# Patient Record
Sex: MALE | Race: WHITE
[De-identification: names, ages, dates, MRNs, and addresses within clinical notes are randomized per-mention and may not be internally consistent; named-entity substitution may affect disease eponyms.]

---

## 2018-01-04 ENCOUNTER — HOSPITAL ENCOUNTER (INPATIENT)
Dept: HOSPITAL 96 - M.ERS | Age: 73
LOS: 3 days | Discharge: HOME | DRG: 246 | End: 2018-01-07
Attending: INTERNAL MEDICINE | Admitting: INTERNAL MEDICINE
Payer: COMMERCIAL

## 2018-01-04 VITALS — SYSTOLIC BLOOD PRESSURE: 125 MMHG | DIASTOLIC BLOOD PRESSURE: 69 MMHG

## 2018-01-04 VITALS — WEIGHT: 189 LBS | BODY MASS INDEX: 27.99 KG/M2 | HEIGHT: 69.02 IN

## 2018-01-04 VITALS — SYSTOLIC BLOOD PRESSURE: 138 MMHG | DIASTOLIC BLOOD PRESSURE: 84 MMHG

## 2018-01-04 VITALS — SYSTOLIC BLOOD PRESSURE: 136 MMHG | DIASTOLIC BLOOD PRESSURE: 74 MMHG

## 2018-01-04 DIAGNOSIS — Z79.4: ICD-10-CM

## 2018-01-04 DIAGNOSIS — Z79.82: ICD-10-CM

## 2018-01-04 DIAGNOSIS — I50.31: ICD-10-CM

## 2018-01-04 DIAGNOSIS — G20: ICD-10-CM

## 2018-01-04 DIAGNOSIS — Z85.820: ICD-10-CM

## 2018-01-04 DIAGNOSIS — Z87.891: ICD-10-CM

## 2018-01-04 DIAGNOSIS — Z90.49: ICD-10-CM

## 2018-01-04 DIAGNOSIS — Z88.5: ICD-10-CM

## 2018-01-04 DIAGNOSIS — E11.65: ICD-10-CM
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
## 2018-01-04 DIAGNOSIS — Z79.51: ICD-10-CM

## 2018-01-04 DIAGNOSIS — I44.7: ICD-10-CM

## 2018-01-04 DIAGNOSIS — Z79.899: ICD-10-CM

## 2018-01-04 DIAGNOSIS — Z88.8: ICD-10-CM

## 2018-01-04 DIAGNOSIS — Z95.5: ICD-10-CM

## 2018-01-04 DIAGNOSIS — E78.5: ICD-10-CM

## 2018-01-04 DIAGNOSIS — I25.110: Primary | ICD-10-CM
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
## 2018-01-04 DIAGNOSIS — T82.855A: ICD-10-CM

## 2018-01-04 DIAGNOSIS — I25.2: ICD-10-CM

## 2018-01-04 LAB
ABSOLUTE BASOPHILS: 0.1 THOU/UL (ref 0–0.2)
ABSOLUTE EOSINOPHILS: 0.1 THOU/UL (ref 0–0.7)
ABSOLUTE MONOCYTES: 0.5 THOU/UL (ref 0–1.2)
ALBUMIN SERPL-MCNC: 3.4 G/DL (ref 3.4–5)
ALP SERPL-CCNC: 78 U/L (ref 46–116)
ALT SERPL-CCNC: 23 U/L (ref 30–65)
ANION GAP SERPL CALC-SCNC: 12 MMOL/L (ref 7–16)
APTT BLD: 24.9 SECONDS (ref 25–31.3)
AST SERPL-CCNC: 14 U/L (ref 15–37)
BASOPHILS NFR BLD AUTO: 0.8 %
BILIRUB SERPL-MCNC: 0.5 MG/DL
BUN SERPL-MCNC: 16 MG/DL (ref 7–18)
CALCIUM SERPL-MCNC: 8.6 MG/DL (ref 8.5–10.1)
CHLORIDE SERPL-SCNC: 99 MMOL/L (ref 98–107)
CO2 SERPL-SCNC: 25 MMOL/L (ref 21–32)
CREAT SERPL-MCNC: 0.9 MG/DL (ref 0.6–1.3)
EOSINOPHIL NFR BLD: 2 %
GLUCOSE SERPL-MCNC: 384 MG/DL (ref 70–99)
GRANULOCYTES NFR BLD MANUAL: 75.4 %
HCT VFR BLD CALC: 49 % (ref 42–52)
HGB BLD-MCNC: 16.2 GM/DL (ref 14–18)
INR PPP: 1.1
LIPASE: 54 U/L (ref 73–393)
LYMPHOCYTES # BLD: 1.1 THOU/UL (ref 0.8–5.3)
LYMPHOCYTES NFR BLD AUTO: 14.7 %
MCH RBC QN AUTO: 27 PG (ref 26–34)
MCHC RBC AUTO-ENTMCNC: 33 G/DL (ref 28–37)
MCV RBC: 81.9 FL (ref 80–100)
MONOCYTES NFR BLD: 7.1 %
MPV: 8.2 FL. (ref 7.2–11.1)
NEUTROPHILS # BLD: 5.5 THOU/UL (ref 1.6–8.1)
NT-PRO BRAIN NAT PEPTIDE: 2720 PG/ML (ref ?–300)
NUCLEATED RBCS: 0 /100WBC
PLATELET COUNT*: 265 THOU/UL (ref 150–400)
POTASSIUM SERPL-SCNC: 4.6 MMOL/L (ref 3.5–5.1)
PROT SERPL-MCNC: 6.8 G/DL (ref 6.4–8.2)
PROTHROMBIN TIME: 10.6 SECONDS (ref 9.2–11.5)
RBC # BLD AUTO: 5.99 MIL/UL (ref 4.5–6)
RDW-CV: 14.3 % (ref 10.5–14.5)
SODIUM SERPL-SCNC: 136 MMOL/L (ref 136–145)
TROPONIN-I LEVEL: <0.06 NG/ML (ref ?–0.06)
WBC # BLD AUTO: 7.3 THOU/UL (ref 4–11)

## 2018-01-05 VITALS — DIASTOLIC BLOOD PRESSURE: 66 MMHG | SYSTOLIC BLOOD PRESSURE: 123 MMHG

## 2018-01-05 VITALS — SYSTOLIC BLOOD PRESSURE: 114 MMHG | DIASTOLIC BLOOD PRESSURE: 56 MMHG

## 2018-01-05 VITALS — SYSTOLIC BLOOD PRESSURE: 111 MMHG | DIASTOLIC BLOOD PRESSURE: 67 MMHG

## 2018-01-05 VITALS — SYSTOLIC BLOOD PRESSURE: 119 MMHG | DIASTOLIC BLOOD PRESSURE: 75 MMHG

## 2018-01-05 VITALS — SYSTOLIC BLOOD PRESSURE: 119 MMHG | DIASTOLIC BLOOD PRESSURE: 66 MMHG

## 2018-01-05 VITALS — SYSTOLIC BLOOD PRESSURE: 132 MMHG | DIASTOLIC BLOOD PRESSURE: 72 MMHG

## 2018-01-05 VITALS — DIASTOLIC BLOOD PRESSURE: 68 MMHG | SYSTOLIC BLOOD PRESSURE: 132 MMHG

## 2018-01-05 VITALS — SYSTOLIC BLOOD PRESSURE: 132 MMHG | DIASTOLIC BLOOD PRESSURE: 66 MMHG

## 2018-01-05 VITALS — SYSTOLIC BLOOD PRESSURE: 126 MMHG | DIASTOLIC BLOOD PRESSURE: 59 MMHG

## 2018-01-05 VITALS — DIASTOLIC BLOOD PRESSURE: 66 MMHG | SYSTOLIC BLOOD PRESSURE: 118 MMHG

## 2018-01-05 VITALS — DIASTOLIC BLOOD PRESSURE: 58 MMHG | SYSTOLIC BLOOD PRESSURE: 112 MMHG

## 2018-01-05 VITALS — SYSTOLIC BLOOD PRESSURE: 107 MMHG | DIASTOLIC BLOOD PRESSURE: 60 MMHG

## 2018-01-05 VITALS — SYSTOLIC BLOOD PRESSURE: 130 MMHG | DIASTOLIC BLOOD PRESSURE: 72 MMHG

## 2018-01-05 VITALS — SYSTOLIC BLOOD PRESSURE: 118 MMHG | DIASTOLIC BLOOD PRESSURE: 66 MMHG

## 2018-01-05 LAB
ABSOLUTE MONOCYTES: 0.2 THOU/UL (ref 0–1.2)
ANION GAP SERPL CALC-SCNC: 11 MMOL/L (ref 7–16)
ANISOCYTOSIS BLD QL SMEAR: (no result)
BUN SERPL-MCNC: 25 MG/DL (ref 7–18)
CALCIUM SERPL-MCNC: 8 MG/DL (ref 8.5–10.1)
CHLORIDE SERPL-SCNC: 102 MMOL/L (ref 98–107)
CHOLEST SERPL-MCNC: 246 MG/DL (ref ?–200)
CO2 SERPL-SCNC: 24 MMOL/L (ref 21–32)
CREAT SERPL-MCNC: 0.7 MG/DL (ref 0.6–1.3)
GLUCOSE SERPL-MCNC: 333 MG/DL (ref 70–99)
GRANULOCYTES NFR BLD MANUAL: 87 %
HCT VFR BLD CALC: 43.1 % (ref 42–52)
HDLC SERPL-MCNC: 34 MG/DL (ref 40–?)
HGB BLD-MCNC: 14.4 GM/DL (ref 14–18)
LDLC SERPL-MCNC: 186 MG/DL (ref ?–100)
LYMPHOCYTES # BLD: 0.5 THOU/UL (ref 0.8–5.3)
LYMPHOCYTES NFR BLD AUTO: 10 %
MCH RBC QN AUTO: 27.2 PG (ref 26–34)
MCHC RBC AUTO-ENTMCNC: 33.4 G/DL (ref 28–37)
MCV RBC: 81.3 FL (ref 80–100)
MONOCYTES NFR BLD: 3 %
MPV: 8.4 FL. (ref 7.2–11.1)
NEUTROPHILS # BLD: 4.4 THOU/UL (ref 1.6–8.1)
NUCLEATED RBCS: 0 /100WBC
PLATELET # BLD EST: ADEQUATE 10*3/UL
PLATELET COUNT*: 248 THOU/UL (ref 150–400)
POIKILOCYTOSIS BLD QL SMEAR: (no result)
POTASSIUM SERPL-SCNC: 4.5 MMOL/L (ref 3.5–5.1)
RBC # BLD AUTO: 5.3 MIL/UL (ref 4.5–6)
RDW-CV: 13.9 % (ref 10.5–14.5)
SODIUM SERPL-SCNC: 137 MMOL/L (ref 136–145)
TC:HDL: 7.2 RATIO
TRIGL SERPL-MCNC: 130 MG/DL (ref ?–150)
TROPONIN-I LEVEL: <0.06 NG/ML (ref ?–0.06)
VLDLC SERPL CALC-MCNC: 26 MG/DL (ref ?–40)
WBC # BLD AUTO: 5 THOU/UL (ref 4–11)

## 2018-01-05 NOTE — EKG
[place] [address]
Phone:  [phone]                     ELECTROCARDIOGRAM REPORT      
_______________________________________________________________________________
 
Name:       ROSE MARY CURRY               Room:           [address]R.#:  [id number]      Account #:      [id number]  
Admission:  18     Attend Phys:    Alessandro Almanza MD 
Discharge:               Date of Birth:  [birth year]  
         Report #: [id number]
    [id number]
_______________________________________________________________________________
THIS REPORT FOR:  //name//                      
 
                         [facility] ED
                                       
Test Date:    2018               Test Time:    16:13:13
Pat Name:     ROSE MARY CURRY           Department:   
Patient ID:   [id number]            Room:         [facility]
Gender:       M                        Technician:   ODALYS WALLACE
:          1945               Requested By: Ashley Bernardo
Order Number: [id number] MD:   Christiano Mcneil
                                 Measurements
Intervals                              Axis          
Rate:         83                       P:            56
NJ:           199                      QRS:          -5
QRSD:         153                      T:            183
QT:           388                                    
QTc:          456                                    
                           Interpretive Statements
Sinus rhythm
Left bundle branch block
Compared to ECG 2017 21:41:27
Atrial abnormality no longer present
 
Electronically Signed On 2018 15:23:36 CST by Christiano Mcneil
[email or web address]
 
 
 
 
 
 
 
 
 
 
 
 
 
 
 
 
 
 
  <ELECTRONICALLY SIGNED>
                                           By: Christiano Mcneil MD, [facility]   
  18     1523
D: 18 1613   _____________________________________
T: 18 1613   Christiano Mcneil MD, [facility]     /EPI

## 2018-01-06 VITALS — DIASTOLIC BLOOD PRESSURE: 58 MMHG | SYSTOLIC BLOOD PRESSURE: 105 MMHG

## 2018-01-06 VITALS — DIASTOLIC BLOOD PRESSURE: 40 MMHG | SYSTOLIC BLOOD PRESSURE: 70 MMHG

## 2018-01-06 VITALS — SYSTOLIC BLOOD PRESSURE: 117 MMHG | DIASTOLIC BLOOD PRESSURE: 67 MMHG

## 2018-01-06 VITALS — DIASTOLIC BLOOD PRESSURE: 89 MMHG | SYSTOLIC BLOOD PRESSURE: 136 MMHG

## 2018-01-06 VITALS — SYSTOLIC BLOOD PRESSURE: 129 MMHG | DIASTOLIC BLOOD PRESSURE: 66 MMHG

## 2018-01-06 VITALS — DIASTOLIC BLOOD PRESSURE: 65 MMHG | SYSTOLIC BLOOD PRESSURE: 115 MMHG

## 2018-01-06 VITALS — DIASTOLIC BLOOD PRESSURE: 62 MMHG | SYSTOLIC BLOOD PRESSURE: 112 MMHG

## 2018-01-06 VITALS — DIASTOLIC BLOOD PRESSURE: 54 MMHG | SYSTOLIC BLOOD PRESSURE: 107 MMHG

## 2018-01-06 VITALS — DIASTOLIC BLOOD PRESSURE: 52 MMHG | SYSTOLIC BLOOD PRESSURE: 109 MMHG

## 2018-01-06 VITALS — SYSTOLIC BLOOD PRESSURE: 123 MMHG | DIASTOLIC BLOOD PRESSURE: 74 MMHG

## 2018-01-06 VITALS — SYSTOLIC BLOOD PRESSURE: 105 MMHG | DIASTOLIC BLOOD PRESSURE: 57 MMHG

## 2018-01-06 VITALS — SYSTOLIC BLOOD PRESSURE: 73 MMHG | DIASTOLIC BLOOD PRESSURE: 46 MMHG

## 2018-01-06 VITALS — DIASTOLIC BLOOD PRESSURE: 39 MMHG | SYSTOLIC BLOOD PRESSURE: 85 MMHG

## 2018-01-06 LAB
ANION GAP SERPL CALC-SCNC: 8 MMOL/L (ref 7–16)
BUN SERPL-MCNC: 20 MG/DL (ref 7–18)
CALCIUM SERPL-MCNC: 8.3 MG/DL (ref 8.5–10.1)
CHLORIDE SERPL-SCNC: 102 MMOL/L (ref 98–107)
CO2 SERPL-SCNC: 27 MMOL/L (ref 21–32)
CREAT SERPL-MCNC: 0.8 MG/DL (ref 0.6–1.3)
GLUCOSE SERPL-MCNC: 247 MG/DL (ref 70–99)
HCT VFR BLD CALC: 43 % (ref 42–52)
HGB BLD-MCNC: 14.5 GM/DL (ref 14–18)
MCH RBC QN AUTO: 27.3 PG (ref 26–34)
MCHC RBC AUTO-ENTMCNC: 33.6 G/DL (ref 28–37)
MCV RBC: 81.2 FL (ref 80–100)
MPV: 8.6 FL. (ref 7.2–11.1)
PLATELET COUNT*: 285 THOU/UL (ref 150–400)
POTASSIUM SERPL-SCNC: 4.5 MMOL/L (ref 3.5–5.1)
RBC # BLD AUTO: 5.3 MIL/UL (ref 4.5–6)
RDW-CV: 14.1 % (ref 10.5–14.5)
SODIUM SERPL-SCNC: 137 MMOL/L (ref 136–145)
TROPONIN-I LEVEL: 0.11 NG/ML (ref ?–0.06)
WBC # BLD AUTO: 11.2 THOU/UL (ref 4–11)

## 2018-01-06 PROCEDURE — 027034Z DILATION OF CORONARY ARTERY, ONE ARTERY WITH DRUG-ELUTING INTRALUMINAL DEVICE, PERCUTANEOUS APPROACH: ICD-10-PCS | Performed by: INTERNAL MEDICINE

## 2018-01-06 PROCEDURE — B2151ZZ FLUOROSCOPY OF LEFT HEART USING LOW OSMOLAR CONTRAST: ICD-10-PCS | Performed by: INTERNAL MEDICINE

## 2018-01-06 PROCEDURE — 4A023N7 MEASUREMENT OF CARDIAC SAMPLING AND PRESSURE, LEFT HEART, PERCUTANEOUS APPROACH: ICD-10-PCS | Performed by: INTERNAL MEDICINE

## 2018-01-06 PROCEDURE — B2111ZZ FLUOROSCOPY OF MULTIPLE CORONARY ARTERIES USING LOW OSMOLAR CONTRAST: ICD-10-PCS | Performed by: INTERNAL MEDICINE

## 2018-01-06 NOTE — EKG
[place] [address]
Phone:  [phone]                     ELECTROCARDIOGRAM REPORT      
_______________________________________________________________________________
 
Name:       ROSE MARY CURRY               Room:           [address]R.#:  [id number]      Account #:      [id number]  
Admission:  18     Attend Phys:    Alessandro Almanza MD 
Discharge:               Date of Birth:  [birth year]  
         Report #: [id number]
    [id number]
_______________________________________________________________________________
THIS REPORT FOR:  //name//                      
 
                          [facility]
                                       
Test Date:    2018               Test Time:    08:09:44
Pat Name:     ROSEM ARY CURRY           Department:   
Patient ID:   [id number]            Room:         [address]
Gender:       M                        Technician:   27
:          1945               Requested By: Steve Shah
Order Number: [id number]    Reading MD:   Christiano Mcneil
                                 Measurements
Intervals                              Axis          
Rate:         93                       P:            62
KS:           196                      QRS:          11
QRSD:         161                      T:            227
QT:           403                                    
QTc:          502                                    
                           Interpretive Statements
Sinus rhythm
Left bundle branch block
Compared to ECG 2018 16:13:13
No significant changes
 
Electronically Signed On 2018 14:39:13 CST by Christiano Mcneil
[email or web address]
 
 
 
 
 
 
 
 
 
 
 
 
 
 
 
 
 
 
  <ELECTRONICALLY SIGNED>
                                           By: Christiano Mcneil MD, [facility]   
  18     1439
D: 18   _____________________________________
T: 18   Christiano Mcneil MD, [facility]     /EPI

## 2018-01-06 NOTE — EKG
[place] [address]
Phone:  [phone]                     ELECTROCARDIOGRAM REPORT      
_______________________________________________________________________________
 
Name:       ROSE MARY CURRY               Room:           [address] 
M.R.#:  [id number]      Account #:      [id number]  
Admission:  18     Attend Phys:    Alessandro Almanza MD 
Discharge:               Date of Birth:  [birth year]  
         Report #: [id number]
    [id number]
_______________________________________________________________________________
THIS REPORT FOR:  //name//                      
 
                          [facility]
                                       
Test Date:    2018               Test Time:    16:09:57
Pat Name:     ROSE MARY CURRY           Department:   
Patient ID:   [id number]            Room:         [address]
Gender:       M                        Technician:   27
:          1945               Requested By: Steve Shah
Order Number: [id number]    Reading MD:   Christiano Mcneil
                                 Measurements
Intervals                              Axis          
Rate:         82                       P:            0
GA:           194                      QRS:          82
QRSD:         129                      T:            -83
QT:           418                                    
QTc:          489                                    
                           Interpretive Statements
Sinus rhythm
Nonspecific intraventricular conduction delay
Probable anteroseptal infarct, recent
Baseline wander in lead(s) V2
Compared to ECG 2018 16:13:13
Intraventricular conduction delay now present
Myocardial infarct finding now present
Left bundle-branch block no longer present
 
Electronically Signed On 2018 14:36:48 CST by Christiano Mcneil
[email or web address]
 
 
 
 
 
 
 
 
 
 
 
 
 
 
  <ELECTRONICALLY SIGNED>
                                           By: Christiano Mcneil MD, FACC   
  18     1436
D: 18 1609   _____________________________________
T: 18 1609   Christiano Mcneil MD, FACC     /EPI

## 2018-01-06 NOTE — CARD
[address]  [address]                    CARDIAC CATH REPORT           
_______________________________________________________________________________
 
Name:       ROSE MARY CURRY               Room:           [address] [place] THOMAS#:  [id number]      Account #:      [id number]  
Admission:  01/04/18     Attend Phys:    Alessandro Almanza MD 
Discharge:               Date of Birth:  [birth year]  
         Report #: [id number]
                                                                     [id number]
_______________________________________________________________________________
THIS REPORT FOR:  //name//                      
 
 
--------------- APPROVED REPORT --------------
 
 
Patient Details
Patient Status: In-Patient                  Room #: 
The patient is a 72 year-old male
 
Event Personnel
Christiano Mcneil  Cardiologist, Elva Andrews  Circulator, 
Parag Mittal  Monitor, Blanca Recio RTR Scrub, , Arnulfo Morris (R) Monitor, Steve Shah  Cardiologist
 
Procedures Performed
Left heart catheterization left ventriculography selective coronary 
atrophy, and percutaneous coronary intervention to the proximal right 
coronary artery
 
Indication
Unstable angina 
 
Risk Factors
Hypercholesterolemia, Hypertension
 
Previous Procedures/Diagnoses
Previous PCI
 
Admission/Lab Medications/Medications given during procedure
Aspirin, Platelet Aff. Inhib., Angiomax bolus and infusion
 
Procedure Narrative
The patient was brought electively to the Cardiac Catheterization 
Laboratory and was prepped and draped in a sterile manner. The right 
wrist was infiltrated with 1% Lidocaine subcutaneous anesthesia. A 
Slender Glidesheath sheath was inserted into the . Coronary 
angiography was performed using coronary diagnostic catheters. The 
right coronary system was accessed and visualized with a DCR: Tiger 
4.0 5fr catheter. The left coronary system was accessed and 
visualized with a DCR: Tiger 4.0 5fr catheter. The left ventricle was 
accessed and visualized with a PC: Angled Pig 5fr catheter. Left 
ventricular/Aortic Valve gradient assessed . Closure device was 
deployed with a Fr Vasc-Band Reg 24cm. The patient tolerated the 
procedure well and there were no complications associated with the 
procedure. 
 
 
 
[place] [address]                    CARDIAC CATH REPORT           
_______________________________________________________________________________
 
Name:       ROSE MARY CURRY               Room:           [address]#:  [id number]      Account #:      [id number]  
Admission:  01/04/18     Attend Phys:    Alessandro Almanza MD 
Discharge:               Date of Birth:  [birth year]  
         Report #: [id number]
                                                                     [id number]
_______________________________________________________________________________
 
Intraoperative Conscious Sedation
Sedation start time:  1410           Case end Time:  
1527    
Fentanyl  25 mcg    Versed  2 mg  
 
Fluoro Time:    27.6 minutes     
Dose:     DAP 594746 cGycm2  3160 mGy  
Contrast Type and Amount:  Visipaque 285 ml    
 
Coronary Angiography
The patient's coronary anatomy is co- dominant. 
 
Diagnostic Cath
Left Main 0% narrowing
LAD  40% proximal narrowing 80% mid LAD in-stent 
restenosis
Circumflex 90% stenosis of a moderate sized subbranch of the first 
marginal branch of the codominant circumflex
Right Coronary Prominent, codominant vessel with 90% proximal 
stenosis and 40% mid right coronary narrowing
 
Left Ventriculography
The left ventricle is normal in size with contractility. The left 
ventricular ejection fraction is estimated to be 50%. There is no 
mitral insufficiency.
 
Hemodynamics
The aortic pressure is 112/60 mmHg with a mean of 75 mmHg. The left 
ventricular pressure is 104/3 mmHg with a mean of mmHg. The left 
ventricular end diastolic pressure is 7 mmHg. There was no gradient 
across the aortic valve upon pullback. 
 
PCI Technique Lesion
Anticoagulation was achieved with Angiomax. Patient was preloaded 
with Angiomax IV 12.75 mg per kg. Percutaneous coronary intervention 
was performed on the proximal right coronary artery. The lesion 
stenosis prior to intervention was 90% with VICKY 3 flow. A 6FR 
LAUNCHER AL.75 Guide Catheter was used to engage the ostium. A IG: 
ProwaterFlex 180CM Interventional Guidewire was used to cross the 
lesion.
 
BALLOON DILATION
A Balloon catheter Trek RX 2.25 X 15 was inserted and inflated up to 
12.00atm for 13seconds. Additional Inflation: 16.00atm for 
13seconds.
 
 
 
[place] [address]                    CARDIAC CATH REPORT           
_______________________________________________________________________________
 
Name:       ROSE MARY CURRY               Room:           [address] 
MARTHA#:  [id number]      Account #:      [id number]  
Admission:  01/04/18     Attend Phys:    Alessandro Almanza MD 
Discharge:               Date of Birth:  [birth year]  
         Report #: [id number]
                                                                     [id number]
_______________________________________________________________________________
 
STENT DEPLOYMENT
A stent Xience Alpine RX 2.5X18 was inserted and inflated up to 
12.00atm for 13seconds. Additional Inflation: 16.00atm for 15seconds. 
Additional Inflation: 17.00atm for 14seconds.
 
POST STENT DEPLOYMENT BALLOON DILATION
A Balloon catheter NC Euphora 2.75x12 was inserted and inflated up to 
15.00atm for 13seconds. Additional Inflation: 17.00atm for 8seconds. 
Additional Inflation: 16.00atm for 10seconds.
 
Final angiography reveals 0 % stenosis with VICKY 3 
flow.
 
Conclusion
#1 significant coronary artery disease characterized by the 
following:
 
A 40% proximal and 80% mid LAD in-stent restenosis,
 
B 90% stenosis of a moderate sized subbranch of the first marginal 
branch of the codominant circumflex,
 
C codominant right coronary with 90% proximal 40% mid vessel 
narrowing
 
#2 mild impairment in global left ventricular systolic function, 
estimated ejection fraction being 50%,
 
#3 normal left-sided hemodynamics study,
 
#4 successful percutaneous coronary intervention with deployment of 
drug-eluting stent at site of 90% proximal right coronary stenosis 
with 0% residual narrowing following stent deployment and VICKY-3 flow 
to the distal vessel. 
 
Recommendations
Aggressive Medical Therapy
 
Medications Administered
Aspirin (any) 
Ticagrelor
 
 
<ELECTRONICALLY SIGNED>
                                        By:  Steve Shah MD, [facility]     
01/06/18     1202
D: 01/06/18 1202_______________________________________
T: 01/06/18 1202Steve Shah MD, FACC        /INF

## 2018-01-07 VITALS — DIASTOLIC BLOOD PRESSURE: 87 MMHG | SYSTOLIC BLOOD PRESSURE: 147 MMHG

## 2018-01-07 VITALS — SYSTOLIC BLOOD PRESSURE: 134 MMHG | DIASTOLIC BLOOD PRESSURE: 82 MMHG

## 2018-01-07 VITALS — SYSTOLIC BLOOD PRESSURE: 139 MMHG | DIASTOLIC BLOOD PRESSURE: 84 MMHG

## 2018-01-07 VITALS — DIASTOLIC BLOOD PRESSURE: 91 MMHG | SYSTOLIC BLOOD PRESSURE: 142 MMHG

## 2018-01-07 VITALS — SYSTOLIC BLOOD PRESSURE: 147 MMHG | DIASTOLIC BLOOD PRESSURE: 87 MMHG

## 2018-01-07 LAB
ABSOLUTE BASOPHILS: 0 THOU/UL (ref 0–0.2)
ABSOLUTE EOSINOPHILS: 0.1 THOU/UL (ref 0–0.7)
ABSOLUTE MONOCYTES: 0.5 THOU/UL (ref 0–1.2)
ANION GAP SERPL CALC-SCNC: 14 MMOL/L (ref 7–16)
BASOPHILS NFR BLD AUTO: 0.4 %
BUN SERPL-MCNC: 20 MG/DL (ref 7–18)
CALCIUM SERPL-MCNC: 8.4 MG/DL (ref 8.5–10.1)
CHLORIDE SERPL-SCNC: 100 MMOL/L (ref 98–107)
CO2 SERPL-SCNC: 24 MMOL/L (ref 21–32)
CREAT SERPL-MCNC: 0.7 MG/DL (ref 0.6–1.3)
EOSINOPHIL NFR BLD: 1.1 %
GLUCOSE SERPL-MCNC: 305 MG/DL (ref 70–99)
GRANULOCYTES NFR BLD MANUAL: 73.9 %
HCT VFR BLD CALC: 42.1 % (ref 42–52)
HGB BLD-MCNC: 14.1 GM/DL (ref 14–18)
LYMPHOCYTES # BLD: 1.1 THOU/UL (ref 0.8–5.3)
LYMPHOCYTES NFR BLD AUTO: 17.1 %
MCH RBC QN AUTO: 27.1 PG (ref 26–34)
MCHC RBC AUTO-ENTMCNC: 33.6 G/DL (ref 28–37)
MCV RBC: 80.8 FL (ref 80–100)
MONOCYTES NFR BLD: 7.5 %
MPV: 8.9 FL. (ref 7.2–11.1)
NEUTROPHILS # BLD: 4.8 THOU/UL (ref 1.6–8.1)
NUCLEATED RBCS: 0 /100WBC
PLATELET COUNT*: 233 THOU/UL (ref 150–400)
POTASSIUM SERPL-SCNC: 4 MMOL/L (ref 3.5–5.1)
RBC # BLD AUTO: 5.21 MIL/UL (ref 4.5–6)
RDW-CV: 14.3 % (ref 10.5–14.5)
SODIUM SERPL-SCNC: 138 MMOL/L (ref 136–145)
WBC # BLD AUTO: 6.6 THOU/UL (ref 4–11)

## 2018-01-07 NOTE — EKG
[place] [address]
Phone:  [phone]                     ELECTROCARDIOGRAM REPORT      
_______________________________________________________________________________
 
Name:       ROSE MARY CURRY               Room:           [address] 
M.R.#:  [id number]      Account #:      [id number]  
Admission:  18     Attend Phys:    Alessandro Almanza MD 
Discharge:               Date of Birth:  [birth year]  
         Report #: [id number]
    [id number]
_______________________________________________________________________________
THIS REPORT FOR:  //name//                      
 
                          [facility]
                                       
Test Date:    2018               Test Time:    13:50:37
Pat Name:     ROSE MARY CURRY           Department:   
Patient ID:   [id number]            Room:         [address]
Gender:       M                        Technician:   [facility]
:          1945               Requested By: Alessandro Almanza
Order Number: [id number]    Reading MD:   Christiano Mcneil
                                 Measurements
Intervals                              Axis          
Rate:         76                       P:            45
MA:           189                      QRS:          -32
QRSD:         133                      T:            146
QT:           440                                    
QTc:          495                                    
                           Interpretive Statements
Sinus rhythm
Probable left atrial enlargement
Left bundle branch block
Baseline wander in lead(s) II,III,aVR,aVF
Compared to ECG 2018 08:09:44
No significant changes
 
Electronically Signed On 2018 13:11:31 CST by Christiano Mcneil
[email or web address]
 
 
 
 
 
 
 
 
 
 
 
 
 
 
 
 
  <ELECTRONICALLY SIGNED>
                                           By: Christiano Mcneil MD, FAC   
  18     1311
D: 18 1350   _____________________________________
T: 18 1350   Christiano Mcneil MD, [facility]     /EPI

## 2018-01-07 NOTE — EKG
[place] [address]
Phone:  [phone]                     ELECTROCARDIOGRAM REPORT      
_______________________________________________________________________________
 
Name:       ROSE MARY CURRY               Room:           [address] 
M.R.#:  [id number]      Account #:      [id number]  
Admission:  18     Attend Phys:    Alessandro lAmanza MD 
Discharge:               Date of Birth:  [birth year]  
         Report #: [id number]
    [id number]
_______________________________________________________________________________
THIS REPORT FOR:  //name//                      
 
                          [facility]
                                       
Test Date:    2018               Test Time:    21:12:34
Pat Name:     ROSE MARY CURRY           Department:   
Patient ID:   [id number]            Room:         [address]
Gender:       M                        Technician:   PE
:          1945               Requested By: Christiano Mcneil
Order Number: [id number]    Reading MD:   Christiano Mcneil
                                 Measurements
Intervals                              Axis          
Rate:         105                      P:            216
HI:           103                      QRS:          21
QRSD:         153                      T:            231
QT:           419                                    
QTc:          554                                    
                           Interpretive Statements
Sinus or ectopic atrial tachycardia
Left bundle branch block
Compared to ECG 2018 08:09:44
Sinus rhythm no longer present
 
Electronically Signed On 2018 13:12:02 CST by Christiano Mcneil
[email or web address]
 
 
 
 
 
 
 
 
 
 
 
 
 
 
 
 
 
 
  <ELECTRONICALLY SIGNED>
                                           By: Christiano Mcneil MD, [facility]   
  18
D: 18   _____________________________________
T: 18   Christiano Mcneil MD, [facility]     /EPI

## 2018-01-09 NOTE — D
[facility] 
[address] [place]  [address]                    DISCHARGE SUMMARY             
_______________________________________________________________________________
 
Name:       ANTOINETTEROSE MARY VARGAS               Room:           [address] [place] THOMAS#:  [id number]      Account #:      [id number]  
Admission:  01/04/18     Attend Phys:    Alessandro Almanza MD 
Discharge:  01/07/18     Date of Birth:  [birth year]  
         Report #: [id number]
                                                                     [id number]  
_______________________________________________________________________________
THIS REPORT FOR:  //name//                      
 
CC: Alessandro Coyne
 
DATE OF SERVICE:  01/06/2018
 
 
FINAL DIAGNOSES:
1.  Unstable angina.
2.  Coronary artery disease status post percutaneous coronary intervention to
native proximal right coronary artery 90% stenosis.
3.  Left bundle-branch block.
4.  Hyperlipidemia.
5.  Diabetes mellitus.
 
HOSPITAL COURSE:  The patient had been having chest discomfort as an outpatient
and underwent a diagnostic cardiac catheterization which revealed a severe
proximal 90% RCA stenosis, which was successfully treated with drug-eluting
stent.  He also had a severe stenosis in a medium to large size obtuse marginal
branch vessel and an instent restenosis in the 70% range to a proximal LAD
stent, which will be addressed at a later date.  Due to the dye load from his
initial PCI, these were not able to be treated at the same time.  He has
preserved LV function.
 
DISCHARGE MEDICATIONS:  Will include Crestor 20 mg daily, Brilinta 1 p.o. b.i.d.
 His diabetic medications were unchanged.  We did switch him from generic
lovastatin to generic Crestor because his LDL was greater than 160.
 
FOLLOWUP:  He will follow up with Muriel Qiu on 01/12/2018.
 
 
 
 
 
 
 
 
 
 
 
 
 
 
<ELECTRONICALLY SIGNED>
                                        By:  Christiano Mcneil MD, FACC   
01/09/18     1012
D: 01/06/18 1225_______________________________________
T: 01/06/18 2305Christiano Mcneil MD, FACC      /nt

## 2018-01-09 NOTE — CON
[address]  [address]                    CONSULTATION                  
_______________________________________________________________________________
 
Name:       ANTOINETTEROSE MARY ALICIA               Room:           [address] Nic THOMAS#:  [id number]      Account #:      [id number]  
Admission:  01/04/18     Attend Phys:    Alessandro Almanza MD 
Discharge:  01/07/18     Date of Birth:  [birth year]  
         Report #: [id number]
                                                                     [id number]  
_______________________________________________________________________________
THIS REPORT FOR:  //name//                      
 
CC: Alessandro Coyne
 
CHIEF COMPLAINT:  Chest pain.
 
HISTORY OF PRESENT ILLNESS:  The patient is a 72-year-old man with a known
history of prior coronary artery disease and acute MI remotely at [facility] in 2000 with PCI, presented with resting chest discomfort, was improved
with nitroglycerin.  This all occurred last night.  He has been noticing
significant dyspnea with exertion, especially when he enters the cold weather. 
Some symptoms are similar to his prior PCI, but he was minimally symptomatic at
the time of his prior MI.  He had had actually presented with elective
outpatient dermatologic surgery and then had developed unstable angina.  He has
not had any cardiovascular followup since then.
 
He presents with a left bundle branch.  His troponin I this morning is 0.06.
 
He denies fevers, chills.  He denies neuro symptoms of slurred speech, numbness
or weakness.
 
PAST MEDICAL HISTORY:
1.  Coronary artery disease with PCI in the acute setting in 2000.
2.  Left bundle-branch block, chronology not known.
3.  Hyperlipidemia.
4.  Diabetes mellitus.
5.  Former smoker, quit in 1992.
 
SOCIAL HISTORY:  No active smoking.
 
PAST SURGICAL HISTORY:  No recent surgeries.
 
ALLERGIES:  He has ALLERGIES TO MORPHINE, HYDROCODONE, and TYLENOL, but denies
aspirin or contrast allergies.
 
FAMILY HISTORY:  Noncontributory.
 
REVIEW OF SYSTEMS:
GASTROINTESTINAL:  No nausea or vomiting.  No hematemesis or melena.
GENITOURINARY:  No dysuria or hematuria.
SKIN:  No rashes.
CARDIOVASCULAR:  Positive chest pain, positive dyspnea with exertion, no
orthopnea.  Positive edema, which is transient.
NEUROLOGIC:  Denies headaches, blurry vision.  He does have a remote diagnosis
of Parkinson's disease also.
 
 
 
[place] [address]                    CONSULTATION                  
_______________________________________________________________________________
 
Name:       ANTOINETTEROSE MARY               [address]           [address]  [id number]      Account #:      [id number]  
Admission:  01/04/18     Attend Phys:    Alessandro Almanza MD 
Discharge:  01/07/18     Date of Birth:  [birth year]  
         Report #: [id number]
                                                                     [id number]  
_______________________________________________________________________________
ENDOCRINE:  He does have hyperlipidemia.
HEMATOLOGIC:  No anemia or bleeding disorders.
ALLERGIES:  As above.
PSYCHIATRIC:  No depression or anxiety.
SKIN:  No rashes.
GENERAL:  No fevers or chills.
 
HOME MEDICATIONS:  Levodopa and carbidopa, fenofibrate 160 mg daily, fluoxetine
20 mg daily, insulin sliding scale, Imdur 30 mg daily, lovastatin 20 mg at
bedtime, nitroglycerin 0.4 mg p.r.n., omeprazole.
 
PHYSICAL EXAMINATION:
VITAL SIGNS:  Blood pressure is 107/60 with a pulse of 76, respirations 15.
GENERAL:  This is a pleasant elderly male who is alert.  He is a poor historian,
but in no apparent distress.
HEENT:  Eyes, EOMs intact.  No facial asymmetry.
NECK:  Supple.  No jugular venous distention.  Carotid upstrokes are normal.
CARDIOVASCULAR:  Regular, I cannot hear a murmur.
LUNGS:  Clear to auscultation.
ABDOMEN:  Soft, nontender.
EXTREMITIES:  No peripheral edema.
SKIN:  Warm and dry.
PSYCHIATRIC:  The patient has appropriate mood and affect.
 
LABORATORY DATA:  ECG demonstrates sinus rhythm, left bundle-branch block. 
Hemoglobin is 14.4; white blood cell count is 5; platelet count is 248,000. 
Sodium is 137, potassium is 4.5, chloride 102, BUN is 25, creatinine is 0.7,
glucose is 333, troponin I is  0.06.  CRP is less than 2.  Chest x-ray reveals
no acute chest process.
 
IMPRESSION:
1.  Unstable angina.  His rest chest discomfort associated with significant
dyspnea with exertion are likely anginal in etiology, especially as he has
numerous cardiovascular risk factors including diabetes, hyperlipidemia, known
history of coronary artery disease.  He presents with a left bundle-branch block
of which we do not know exactly when this was previously present, if not at all.
 At this point in time, I have recommended a direct evaluation with cardiac
catheterization based on some unstable features of his presentation.  The
patient elects to proceed.
2.  Coronary artery disease as noted above.  We will continue with aspirin,
nitrate therapy and evaluation with cardiac catheterization.
 
 
 
[place] [address]                    CONSULTATION                  
_______________________________________________________________________________
 
Name:       ANTOINETTEROSE MARY               Room:           [address] Nic THOMAS#:  [id number]      Account #:      [id number]  
Admission:  01/04/18     Attend Phys:    Alessandro Almanza MD 
Discharge:  01/07/18     Date of Birth:  [birth year]  
         Report #: [id number]
                                                                     [id number]  
_______________________________________________________________________________
3.  Diabetes mellitus.  Per hospital colleagues.
4.  Hyperlipidemia.  I would continue with the statin and fenofibrate.
 
 
 
 
 
 
 
 
 
 
 
 
 
 
 
 
 
 
 
 
 
 
 
 
 
 
 
 
 
 
 
 
 
 
 
 
 
 
 
 
 
 
<ELECTRONICALLY SIGNED>
                                        By:  Christiano Mcneil MD, FACC   
01/09/18     0944
D: 01/05/18 0953_______________________________________
T: 01/05/18 1105Christiano Mcneil MD, FACC      /nt

## 2018-01-10 ENCOUNTER — HOSPITAL ENCOUNTER (INPATIENT)
Dept: HOSPITAL 96 - M.ERS | Age: 73
LOS: 1 days | DRG: 246 | End: 2018-01-11
Attending: INTERNAL MEDICINE | Admitting: INTERNAL MEDICINE
Payer: COMMERCIAL

## 2018-01-10 VITALS — DIASTOLIC BLOOD PRESSURE: 67 MMHG | SYSTOLIC BLOOD PRESSURE: 115 MMHG

## 2018-01-10 VITALS — BODY MASS INDEX: 28.9 KG/M2 | HEIGHT: 69.02 IN | WEIGHT: 195.11 LBS

## 2018-01-10 VITALS — SYSTOLIC BLOOD PRESSURE: 140 MMHG | DIASTOLIC BLOOD PRESSURE: 81 MMHG

## 2018-01-10 VITALS — DIASTOLIC BLOOD PRESSURE: 81 MMHG | SYSTOLIC BLOOD PRESSURE: 154 MMHG

## 2018-01-10 VITALS — SYSTOLIC BLOOD PRESSURE: 173 MMHG | DIASTOLIC BLOOD PRESSURE: 109 MMHG

## 2018-01-10 VITALS — DIASTOLIC BLOOD PRESSURE: 74 MMHG | SYSTOLIC BLOOD PRESSURE: 131 MMHG

## 2018-01-10 DIAGNOSIS — Z95.5: ICD-10-CM

## 2018-01-10 DIAGNOSIS — I50.33: ICD-10-CM

## 2018-01-10 DIAGNOSIS — Z88.6: ICD-10-CM

## 2018-01-10 DIAGNOSIS — I25.110: ICD-10-CM
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
## 2018-01-10 DIAGNOSIS — E78.5: ICD-10-CM

## 2018-01-10 DIAGNOSIS — Z85.820: ICD-10-CM

## 2018-01-10 DIAGNOSIS — Z79.4: ICD-10-CM

## 2018-01-10 DIAGNOSIS — Z79.899: ICD-10-CM

## 2018-01-10 DIAGNOSIS — E11.65: ICD-10-CM

## 2018-01-10 DIAGNOSIS — Z90.49: ICD-10-CM

## 2018-01-10 DIAGNOSIS — Z88.8: ICD-10-CM

## 2018-01-10 DIAGNOSIS — R57.0: ICD-10-CM

## 2018-01-10 DIAGNOSIS — I21.4: ICD-10-CM

## 2018-01-10 DIAGNOSIS — I25.2: ICD-10-CM
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
## 2018-01-10 DIAGNOSIS — G20: ICD-10-CM

## 2018-01-10 DIAGNOSIS — T82.867A: Primary | ICD-10-CM

## 2018-01-10 DIAGNOSIS — Z87.891: ICD-10-CM

## 2018-01-10 DIAGNOSIS — I95.9: ICD-10-CM

## 2018-01-10 LAB
ABSOLUTE BASOPHILS: 0.1 THOU/UL (ref 0–0.2)
ABSOLUTE EOSINOPHILS: 0.3 THOU/UL (ref 0–0.7)
ABSOLUTE MONOCYTES: 0.7 THOU/UL (ref 0–1.2)
ALBUMIN SERPL-MCNC: 3.6 G/DL (ref 3.4–5)
ALP SERPL-CCNC: 73 U/L (ref 46–116)
ALT SERPL-CCNC: 18 U/L (ref 30–65)
ANION GAP SERPL CALC-SCNC: 13 MMOL/L (ref 7–16)
APTT BLD: 24.2 SECONDS (ref 25–31.3)
AST SERPL-CCNC: 8 U/L (ref 15–37)
BASOPHILS NFR BLD AUTO: 0.7 %
BILIRUB SERPL-MCNC: 0.6 MG/DL
BUN SERPL-MCNC: 17 MG/DL (ref 7–18)
CALCIUM SERPL-MCNC: 9.1 MG/DL (ref 8.5–10.1)
CHLORIDE SERPL-SCNC: 94 MMOL/L (ref 98–107)
CK-MB MASS: 0.7 NG/ML
CO2 SERPL-SCNC: 28 MMOL/L (ref 21–32)
CREAT SERPL-MCNC: 0.8 MG/DL (ref 0.6–1.3)
EOSINOPHIL NFR BLD: 2.8 %
GLUCOSE SERPL-MCNC: 408 MG/DL (ref 70–99)
GRANULOCYTES NFR BLD MANUAL: 60 %
HCT VFR BLD CALC: 49.3 % (ref 42–52)
HGB BLD-MCNC: 16.1 GM/DL (ref 14–18)
INR PPP: 1
LIPASE: 702 U/L (ref 73–393)
LYMPHOCYTES # BLD: 2.9 THOU/UL (ref 0.8–5.3)
LYMPHOCYTES NFR BLD AUTO: 29 %
MAGNESIUM SERPL-MCNC: 1.8 MG/DL (ref 1.8–2.4)
MCH RBC QN AUTO: 27.1 PG (ref 26–34)
MCHC RBC AUTO-ENTMCNC: 32.8 G/DL (ref 28–37)
MCV RBC: 82.8 FL (ref 80–100)
MONOCYTES NFR BLD: 7.5 %
MPV: 8.7 FL. (ref 7.2–11.1)
NEUTROPHILS # BLD: 5.9 THOU/UL (ref 1.6–8.1)
NUCLEATED RBCS: 0 /100WBC
PLATELET COUNT*: 350 THOU/UL (ref 150–400)
POTASSIUM SERPL-SCNC: 3.9 MMOL/L (ref 3.5–5.1)
PROT SERPL-MCNC: 7.1 G/DL (ref 6.4–8.2)
PROTHROMBIN TIME: 10.1 SECONDS (ref 9.2–11.5)
RBC # BLD AUTO: 5.96 MIL/UL (ref 4.5–6)
RDW-CV: 14.4 % (ref 10.5–14.5)
SODIUM SERPL-SCNC: 135 MMOL/L (ref 136–145)
TROPONIN-I LEVEL: 0.14 NG/ML (ref ?–0.06)
WBC # BLD AUTO: 9.8 THOU/UL (ref 4–11)

## 2018-01-10 NOTE — EKG
[place] [address]
Phone:  [phone]                     ELECTROCARDIOGRAM REPORT      
_______________________________________________________________________________
 
Name:       ROSE MARY CURRY               Room:           [place] [address]   ADM IN  
[facility].#:  [id number]      Account #:      [id number]  
Admission:  01/10/18     Attend Phys:    Alessandro Almanza MD 
Discharge:               Date of Birth:  [birth year]  
         Report #: [id number]
    [id number]
_______________________________________________________________________________
THIS REPORT FOR:  //name//                      
 
                         [facility] ED
                                       
Test Date:    2018-01-10               Test Time:    09:48:04
Pat Name:     ROSE MARY CURRY           Department:   
Patient ID:   [id number]            Room:         [facility]
Gender:       M                        Technician:   ODALYS BECK
:          1945               Requested By: Boyd Young
Order Number: [id number] MD:   Talib Daugherty
                                 Measurements
Intervals                              Axis          
Rate:         108                      P:            0
VA:           161                      QRS:          19
QRSD:         155                      T:            217
QT:           366                                    
QTc:          491                                    
                           Interpretive Statements
Sinus tachycardia
Left bundle branch block
Baseline wander in lead(s) V1,V2
Compared to ECG 2018 21:12:34
No significant changes
 
Electronically Signed On 1- 16:22:13 CST by Talib Daugherty
[email or web address]
 
 
 
 
 
 
 
 
 
 
 
 
 
 
 
 
 
  <ELECTRONICALLY SIGNED>
                                           By: Talib Daugherty MD, [facility]      
  01/10/18     1622
D: 01/10/18 0948   _____________________________________
T: 01/10/18 0948   Talib Daugherty MD, [facility]        /EPI

## 2018-01-11 VITALS — DIASTOLIC BLOOD PRESSURE: 85 MMHG | SYSTOLIC BLOOD PRESSURE: 136 MMHG

## 2018-01-11 VITALS — SYSTOLIC BLOOD PRESSURE: 138 MMHG | DIASTOLIC BLOOD PRESSURE: 85 MMHG

## 2018-01-11 VITALS — DIASTOLIC BLOOD PRESSURE: 85 MMHG | SYSTOLIC BLOOD PRESSURE: 142 MMHG

## 2018-01-11 VITALS — SYSTOLIC BLOOD PRESSURE: 134 MMHG | DIASTOLIC BLOOD PRESSURE: 80 MMHG

## 2018-01-11 VITALS — SYSTOLIC BLOOD PRESSURE: 134 MMHG | DIASTOLIC BLOOD PRESSURE: 89 MMHG

## 2018-01-11 VITALS — SYSTOLIC BLOOD PRESSURE: 80 MMHG | DIASTOLIC BLOOD PRESSURE: 42 MMHG

## 2018-01-11 VITALS — SYSTOLIC BLOOD PRESSURE: 125 MMHG | DIASTOLIC BLOOD PRESSURE: 76 MMHG

## 2018-01-11 VITALS — SYSTOLIC BLOOD PRESSURE: 62 MMHG | DIASTOLIC BLOOD PRESSURE: 30 MMHG

## 2018-01-11 VITALS — DIASTOLIC BLOOD PRESSURE: 92 MMHG | SYSTOLIC BLOOD PRESSURE: 143 MMHG

## 2018-01-11 VITALS — DIASTOLIC BLOOD PRESSURE: 99 MMHG | SYSTOLIC BLOOD PRESSURE: 158 MMHG

## 2018-01-11 VITALS — DIASTOLIC BLOOD PRESSURE: 90 MMHG | SYSTOLIC BLOOD PRESSURE: 142 MMHG

## 2018-01-11 VITALS — SYSTOLIC BLOOD PRESSURE: 62 MMHG | DIASTOLIC BLOOD PRESSURE: 32 MMHG

## 2018-01-11 VITALS — DIASTOLIC BLOOD PRESSURE: 78 MMHG | SYSTOLIC BLOOD PRESSURE: 141 MMHG

## 2018-01-11 VITALS — DIASTOLIC BLOOD PRESSURE: 62 MMHG | SYSTOLIC BLOOD PRESSURE: 110 MMHG

## 2018-01-11 VITALS — SYSTOLIC BLOOD PRESSURE: 121 MMHG | DIASTOLIC BLOOD PRESSURE: 70 MMHG

## 2018-01-11 VITALS — SYSTOLIC BLOOD PRESSURE: 145 MMHG | DIASTOLIC BLOOD PRESSURE: 94 MMHG

## 2018-01-11 VITALS — SYSTOLIC BLOOD PRESSURE: 70 MMHG | DIASTOLIC BLOOD PRESSURE: 56 MMHG

## 2018-01-11 VITALS — SYSTOLIC BLOOD PRESSURE: 107 MMHG | DIASTOLIC BLOOD PRESSURE: 69 MMHG

## 2018-01-11 LAB
ABSOLUTE MONOCYTES: 0.8 THOU/UL (ref 0–1.2)
ANION GAP SERPL CALC-SCNC: 14 MMOL/L (ref 7–16)
ANISOCYTOSIS BLD QL SMEAR: (no result)
BE: -19.5 MMOL/L
BUN SERPL-MCNC: 27 MG/DL (ref 7–18)
CALCIUM SERPL-MCNC: 8.4 MG/DL (ref 8.5–10.1)
CHLORIDE SERPL-SCNC: 102 MMOL/L (ref 98–107)
CO2 SERPL-SCNC: 21 MMOL/L (ref 21–32)
CREAT SERPL-MCNC: 0.7 MG/DL (ref 0.6–1.3)
GLUCOSE SERPL-MCNC: 129 MG/DL (ref 70–99)
GRANULOCYTES NFR BLD MANUAL: 88 %
HCO3 BLD-SCNC: 6.6 MMOL/L (ref 22–26)
HCT VFR BLD CALC: 43.2 % (ref 42–52)
HGB BLD-MCNC: 14.2 GM/DL (ref 14–18)
LYMPHOCYTES # BLD: 0.6 THOU/UL (ref 0.8–5.3)
LYMPHOCYTES NFR BLD AUTO: 5 %
MCH RBC QN AUTO: 26.6 PG (ref 26–34)
MCHC RBC AUTO-ENTMCNC: 32.8 G/DL (ref 28–37)
MCV RBC: 81.3 FL (ref 80–100)
MONOCYTES NFR BLD: 6 %
MPV: 8.9 FL. (ref 7.2–11.1)
NEUTROPHILS # BLD: 11.3 THOU/UL (ref 1.6–8.1)
NEUTS BAND NFR BLD: 1 %
NUCLEATED RBCS: 0 /100WBC
PCO2 BLD: 18.3 MMHG (ref 35–45)
PLATELET # BLD EST: ADEQUATE 10*3/UL
PLATELET COUNT*: 325 THOU/UL (ref 150–400)
PO2 BLD: 328.7 MMHG (ref 75–100)
POIKILOCYTOSIS BLD QL SMEAR: (no result)
POTASSIUM SERPL-SCNC: 4.3 MMOL/L (ref 3.5–5.1)
RBC # BLD AUTO: 5.32 MIL/UL (ref 4.5–6)
RDW-CV: 14.3 % (ref 10.5–14.5)
SODIUM SERPL-SCNC: 137 MMOL/L (ref 136–145)
WBC # BLD AUTO: 12.7 THOU/UL (ref 4–11)

## 2018-01-11 PROCEDURE — 0BH17EZ INSERTION OF ENDOTRACHEAL AIRWAY INTO TRACHEA, VIA NATURAL OR ARTIFICIAL OPENING: ICD-10-PCS | Performed by: INTERNAL MEDICINE

## 2018-01-11 PROCEDURE — 027035Z DILATION OF CORONARY ARTERY, ONE ARTERY WITH TWO DRUG-ELUTING INTRALUMINAL DEVICES, PERCUTANEOUS APPROACH: ICD-10-PCS | Performed by: INTERNAL MEDICINE

## 2018-01-11 PROCEDURE — 5A1935Z RESPIRATORY VENTILATION, LESS THAN 24 CONSECUTIVE HOURS: ICD-10-PCS | Performed by: INTERNAL MEDICINE

## 2018-01-11 PROCEDURE — 4A023N7 MEASUREMENT OF CARDIAC SAMPLING AND PRESSURE, LEFT HEART, PERCUTANEOUS APPROACH: ICD-10-PCS | Performed by: INTERNAL MEDICINE

## 2018-01-11 PROCEDURE — 5A1223Z PERFORMANCE OF CARDIAC PACING, CONTINUOUS: ICD-10-PCS | Performed by: INTERNAL MEDICINE

## 2018-01-11 NOTE — EKG
[place] [address]
Phone:  [phone]                     ELECTROCARDIOGRAM REPORT      
_______________________________________________________________________________
 
Name:       ROSE MARY CURRY               Room:           [address]    ADM IN  
.R.#:  [id number]      Account #:      [id number]  
Admission:  01/10/18     Attend Phys:    Alessandro Almanza MD 
Discharge:               Date of Birth:  [birth year]  
         Report #: [id number]
    [id number]
_______________________________________________________________________________
THIS REPORT FOR:  //name//                      
 
                          [facility]
                                       
Test Date:    2018               Test Time:    00:56:58
Pat Name:     ROSE MARY CURRY           Department:   
Patient ID:   [id number]            Room:         [facility]
Gender:       M                        Technician:   
:          1945               Requested By: Mark Peck
Order Number: [id number]    Shanita MD:   Josemanuel Mirza
                                 Measurements
Intervals                              Axis          
Rate:         84                       P:            48
CA:           161                      QRS:          -34
QRSD:         128                      T:            136
QT:           398                                    
QTc:          471                                    
                           Interpretive Statements
Sinus rhythm
Left bundle branch block
Baseline wander in lead(s) V2
Compared to ECG 01/10/2018 09:48:04
Sinus tachycardia no longer present
 
Electronically Signed On 2018 17:29:35 CST by Josemanuel Mirza
[email or web address]
 
 
 
 
 
 
 
 
 
 
 
 
 
 
 
 
 
  <ELECTRONICALLY SIGNED>
                                           By: Josemanuel Mirza MD, FAC   
  18     1729
D: 18 0056   _____________________________________
T: 18 0056   Josemanuel Mirza MD, [facility]     /EPI

## 2018-01-11 NOTE — CON
[address]  [address]                    CONSULTATION                  
_______________________________________________________________________________
 
Name:       ROSE MARY CURRY               Room:           [address] [place]  
..#:  [id number]      Account #:      [id number]  
Admission:  01/10/18     Attend Phys:    Alessandro Almanza MD 
Discharge:  01/11/18     Date of Birth:  [birth year]  
         Report #: [id number]
                                                                     [id number]  
_______________________________________________________________________________
THIS REPORT FOR:  //name//                      
 
CC: Alessandro Coyne DO
 
DATE OF SERVICE:  01/10/2018
 
 
CARDIOLOGY CONSULTATION
 
HISTORY OF PRESENT ILLNESS:  The patient is a 72-year-old  white male who
I was asked to see in the hospital after he complained of chest pain.  The
patient has an extensive past medical history.  Unfortunately, not all of his
old records are available.  The patient apparently had previous coronary artery
stenting of his LAD at [facility] years ago.  He was told at that time, he
had had a myocardial infarction.  He had a previous nuclear stress test here at
[facility] in 2015 that showed no evidence of ischemia with a fixed basal
inferior defect, ejection fraction 48%.  The patient had an echocardiogram in
2016 here at [facility] that showed an ejection fraction 50% with left atrial
enlargement.  The patient was just admitted here to [facility] a week ago.  He
complained of lightheadedness, not feeling well, dizziness.  He was noted to
have an abnormal ECG.  He was seen by Dr. Mcneil.  He did note some shortness
of breath.  His troponin 0.06.  He was felt to be having anginal equivalent with
left bundle branch block.  Dr. Shah performed cardiac catheterization on
January 5th last week.  It was performed from the right radial artery.  The LAD
had a stent.  The marginal branch gave off a small side branch had an ostial 90%
stenosis.  The right coronary had proximal 90% stenosis, ejection fraction of
50%.  Dr. Shah then placed a single drug-eluting stent in the proximal right
coronary artery.  He tolerated this well.  He was placed on discharged on
Brilinta and switched from lovastatin to Crestor.  He initially did well after
his discharge; however, he notes that last night he felt a pain in his chest,
went into his jaw, left arm.  He felt short of breath, diaphoretic, he took a
nitroglycerin it did not seem to help.  The pain occurred off and on throughout
the night.  He was coughing.  He had had no bleeding.  He finally came to the
hospital today and was admitted.  He is not very active because of his
Parkinson's disease.  Denies exertional dyspnea, notes an irregular heartbeat
occasionally, but no syncope or edema.
 
PAST MEDICAL AND SURGICAL HISTORY:  Otherwise significant for back surgery,
appendectomy, hiatal hernia surgery, cholecystectomy.  He has Parkinson's
disease, diabetes, hyperlipidemia.
 
CURRENT MEDICATIONS:  Consists of omeprazole, insulin, Brilinta, Crestor,
fenofibrate, nebulizer, Flonase.
 
 
 
 
[place] [address]                    CONSULTATION                  
_______________________________________________________________________________
 
Name:       ROSE MARY CURRY               Room:           [address]#:  [id number]      Account #:      [id number]  
Admission:  01/10/18     Attend Phys:    Alessandro Almanza MD 
Discharge:  01/11/18     Date of Birth:  [birth year]  
         Report #: [id number]
                                                                     [id number]  
_______________________________________________________________________________
ALLERGIES:  He has intolerance to MORPHINE.
 
FAMILY HISTORY:  Negative for heart disease.
 
SOCIAL HISTORY:  He is .  He and his wife live in [place].  His wife
is a retired nurse.  The patient is a retired .  Quit smoking
years ago.  No alcohol abuse.
 
REVIEW OF SYSTEMS:  He has no history of stroke.  He had asthma as a child.  He
had a peptic ulcer in the past.  No liver disease, no kidney disease.  He has
skin cancer removed in the past.  He has Parkinson's disease.
 
PHYSICAL EXAMINATION:
GENERAL:  Revealed an elderly male lying on a stretcher.  He appeared in no
acute distress.
VITAL SIGNS:  He had a blood pressure 130/80, pulse is 80.  He is afebrile.
HEENT:  He is anicteric.  Conjunctivae pink.  Mucous membranes moist.
NECK:  Veins nondistended.  No carotid bruits.  Neck was supple.
CHEST:  Clear to auscultation.
CARDIOVASCULAR:  Regular rate and rhythm.
ABDOMEN:  Soft, nontender.
EXTREMITIES:  Had no edema.  Dorsalis pedis pulse 2+ bilaterally.
SKIN:  Warm, dry.
NEUROLOGIC:  Nonfocal.
 
LABORATORY DATA:  His ECG on admission showed sinus rhythm with left bundle
branch block.  His workup in the emergency room, he had a chest x-ray that
showed normal heart size, clear lung fields.  His lab work:  Sodium 135,
creatinine 0.8, glucose 408.  Liver function studies are normal.  Troponin was
elevated last week at 0.34 to now 0.14.  Recent cholesterol 246, triglyceride
130, HDL 34, .  White blood cell count 9.8, hemoglobin 16.1.
 
IMPRESSION AND RECOMMENDATIONS:
1.  Unstable angina.  Recommend stenting of the circumflex.
2.  Parkinson's disease.
3.  Diabetes.
4.  Hyperlipidemia.
 
 
 
 
 
 
 
<ELECTRONICALLY SIGNED>
                                        By:  Talib Daugherty MD, [facility]      
01/11/18     1934
D: 01/10/18 1505_______________________________________
T: 01/10/18 2254Drashard Daugherty MD, FACC         /nt

## 2018-01-12 NOTE — CARD
[address]  [address]                    CARDIAC CATH REPORT           
_______________________________________________________________________________
 
Name:       ROSE MARY CURRY               Room:           [address] [place]  
..#:  [id number]      Account #:      [id number]  
Admission:  01/10/18     Attend Phys:    Alessandro Almanza MD 
Discharge:  01/11/18     Date of Birth:  [birth year]  
         Report #: [id number]
                                                                     [id number]
_______________________________________________________________________________
THIS REPORT FOR:  //name//                      
 
 
--------------- APPROVED REPORT --------------
 
 
Patient Details
Patient Status: In-Patient                  Room #: 
The patient is a 72 year-old male
 
Event Personnel
Talib Daugherty  Cardiologist, Macie Bronson RN Circulator, 
Arianna Chery  Monitor, Blanca Recio RTR Scrub
 
Procedures Performed
cath pciArt Access - R radial artery  , Left Heart Catheterization, 
PTCA with Stenting
 
Indication
Abnormal ECG, Unstable angina 
 
Risk Factors
Coronary Artery Disease
 
Previous Procedures/Diagnoses
Previous PCI
 
Admission/Lab Medications/Medications given during 
procedure
Glycoprotein IllbIlla Inhibitors, Platelet Aff. Inhib.
 
Procedure Narrative
The patient was brought electively to the Cardiac Catheterization 
Laboratory and was prepped and draped in a sterile manner. The right 
wrist was infiltrated with 1% Lidocaine subcutaneous anesthesia. A 6 
fr sheath was inserted into the right radial artery. Coronary 
angiography was performed using coronary diagnostic catheters. The 
right coronary system was accessed and visualized with a 
[id number] 6fr catheter. Left ventricular/Aortic Valve gradient 
assessed via catheter pullback. The patient tolerated the procedure 
well and there were no complications associated with the procedure. 
There was no hematoma. Arteriogram was not performed of the left 
coronary artery that had been visualized at the time of cardiac cath 
performed on Jan. 5, 2018
 
Intraoperative Conscious Sedation
 
 
 
[address]  [address]                    CARDIAC CATH REPORT           
_______________________________________________________________________________
 
Name:       ROSE MARY CURRY               Room:           [address]#:  [id number]      Account #:      [id number]  
Admission:  01/10/18     Attend Phys:    Alessandro Almanza MD 
Discharge:  01/11/18     Date of Birth:  [birth year]  
         Report #: [id number]
                                                                     [id number]
_______________________________________________________________________________
Sedation start time:  12:15           Case end Time:  
13:07   
Fentanyl  25 mcg      
 
Fluoro Time:    9.0 minutes     
Dose:     DAP 088534 cGycm2  1088.23 mGy  
Contrast Type and Amount:  Omnipaque 150 ml    
 
Coronary Angiography
The patient's coronary anatomy is co- dominant. 
 
Diagnostic Cath
Right Coronary A stent was noted in the proximal RCA that was 
completely occluded with thrombus.
 
Left Ventriculography
Left Ventriculography was not performed.     
 
Hemodynamics
The aortic pressure is 128/79 mmHg with a mean of mmHg. The left 
ventricular pressure is 126/2 mmHg with a mean of mmHg. The left 
ventricular end diastolic pressure is 16 mmHg. There was no gradient 
across the aortic valve upon pullback. Pullback from the left 
ventricle to the aorta revealed no gradient across the aortic 
valve.
 
PCI Technique Lesion
Anticoagulation was achieved with lovenox 30 mg IV. iv aggrastat was 
used Patient was preloaded with Brillinta. Percutaneous coronary 
intervention was performed on the proximal right coronary artery. The 
lesion stenosis prior to intervention was 100% with VICKY 0 flow. A 
RCB 6fr Guide Catheter was used to engage the rca ostium. A IG: BMW 
190cm Interventional Guidewire was used to cross the lesion.
 
BALLOON DILATION
A Balloon catheter 2.5 x 8 mm was inserted and inflated up to 16atm 
for 15seconds. Repeat angiography revealed the following 
post-dilatation results: 70% stenosis with thrombus noted. Additional 
Inflation: 18atm for 15seconds. Additional Inflation: 18atm for 
15seconds. Arteriogram revealed diffusely narrowed RCA that improved 
with IC nitro. 80% discrete stenosis was noted at the acute margin of 
the vessel
 
STENT DEPLOYMENT
A drug-eluting stent Resolute RX 2.5X18 was inserted and inflated up 
to 11atm for 10seconds. Repeat angiography revealed the following 
 
 
 
[place] [address]                    CARDIAC CATH REPORT           
_______________________________________________________________________________
 
Name:       ROSE MARY CURRY               Room:           [address]#:  [id number]      Account #:      [id number]  
Admission:  01/10/18     Attend Phys:    Alessandro Almanza MD 
Discharge:  01/11/18     Date of Birth:  [birth year]  
         Report #: [id number]
                                                                     [id number]
_______________________________________________________________________________
post-stent deployment results: 0% stenosis.
 
Final angiography reveals 0 % stenosis with VICKY 3 flow.
 
PCI Technique Lesion 2
Percutaneous Coronary Intervention was performed on the proximal 
right coronary artery. Patient was preloaded with Brillinta. 
Percutaneous coronary intervention was performed on the proximal 
right coronary artery. The lesion stenosis prior to intervention was 
100% with VICKY 0 flow. A RCB 6fr Guide Catheter was used to engage 
the rca ostium. A IG: BMW 190cm Interventional Guidewire was used to 
cross the lesion.
 
Balloon Dilation
A Balloon catheter 2.5 x 8 mm was inserted and inflated up to 18atm 
for 15seconds. Repeat angiography revealed the following 
post-dilatation results: 70% stenosis with clot.
 
Stent Deployment
A drug-eluting stent Resolute RX 3X15 was inserted and inflated up to 
16atm for 10seconds. Repeat angiography revealed the following 
post-stent deployment results: 0% stenosis.
 
Final angiography reveals 0 % stenosis with VICKY 3 
flow.
 
Conclusion
1.  Subacute thrombosis of a stent in the proximal RCA
2.  Stenting of a 80% stenosis at the acute margin of the RCA
3.  Successful repeat stenting of the proximal RCA
4.  The patient had not filled his prescription for Brilinta which 
was given to him following placement of the stent in the rca 5 days 
prior to his presentation 
 
Recommendations
Aggressive Medical Therapy
 
 
 
 
 
 
 
 
<ELECTRONICALLY SIGNED>
                                        By:  Talib Daugherty MD, [facility]      
01/12/18     1410
D: 01/12/18 1410_______________________________________
T: 01/12/18 1410Daty Daugherty MD, FAC         /INF

## 2018-01-12 NOTE — EKG
[place] [address]
Phone:  [phone]                     ELECTROCARDIOGRAM REPORT      
_______________________________________________________________________________
 
Name:       ROSE MARY CURRY               Room:           [address] [place]  
.R.#:  [id number]      Account #:      [id number]  
Admission:  01/10/18     Attend Phys:    Alessandro Almanza MD 
Discharge:  18     Date of Birth:  [birth year]  
         Report #: [id number]
    [id number]
_______________________________________________________________________________
THIS REPORT FOR:  //name//                      
 
                          [facility]
                                       
Test Date:    2018               Test Time:    17:13:35
Pat Name:     ROSE MARY CURRY           Department:   
Patient ID:   [id number]            Room:         [address]
Gender:       M                        Technician:   [email or web address]
:          1945               Requested By: Talib Daugherty
Order Number: [id number]    Shanita MD:   Talib Daugherty
                                 Measurements
Intervals                              Axis          
Rate:         39                       P:            
LA:                                    QRS:          -60
QRSD:         142                      T:            63
QT:           532                                    
QTc:          429                                    
                           Interpretive Statements
junctional rhythm
IVCD, consider atypical RBBB
 
Inferior infarct, acute (RCA)
Probable RV involvement, suggest recording right precordial leads
Baseline wander in lead(s) II,III,aVF,V2,V3,V4
Compared to ECG 2018 00:56:58
 
Sinus rhythm no longer present
Left bundle-branch block no longer present
 
Electronically Signed On 2018 12:27:40 CST by Talib Daugherty
[email or web address]
 
 
 
 
 
 
 
 
 
 
 
 
  <ELECTRONICALLY SIGNED>
                                           By: Talib Daugherty MD, [facility]      
  18     1227
D: 18 1713   _____________________________________
T: 18 1713   Talib Daugherty MD, [facility]        /EPI

## 2018-01-12 NOTE — D
[address]  [address]                    DISCHARGE SUMMARY             
_______________________________________________________________________________
 
Name:       ROSE MARY CURRY               Room:           [address] [place]  
.R.#:  [id number]      Account #:      [id number]  
Admission:  01/10/18     Attend Phys:    Alessandro Almanza MD 
Discharge:  18     Date of Birth:  [birth year]  
         Report #: [id number]
                                                                     [id number]  
_______________________________________________________________________________
THIS REPORT FOR:  //name//                      
 
CC: Alessandro Coyne DO
 
DATE OF SERVICE:  2018
 
 
DEATH NOTE
 
DATE OF DEATH:  2018
 
DIAGNOSES:
1.  Acute non-ST segment elevation myocardial infarction.
2.  Coronary artery disease.
3.  Parkinson's disease.
4.  Diabetes.
 
CONSULTANTS:  None.
 
PROCEDURES:
1.  Left heart catheterization with placement of 2 drug-eluting stents in the
right coronary artery via the radial approach.
2.  Placement of a temporary pacing lead through the right femoral vein.
 
HISTORY OF PRESENT ILLNESS:  The patient is a 72-year-old  white male who
came to the Emergency Room complaining of chest pain.  The patient had a
previous stent placed in his LAD at [facility] years ago.  He apparently
continues to see his cardiologist approximately once a year.  He stays fairly
active.  The patient presented to [facility] a week ago with intermittent chest
pain.  He noticed shortness of breath.  He was taken to the cath lab by Dr. Shah.  The stent in the LAD had no significant restenosis.  There was a
marginal branch gave off a small side branch and had an ostial of 90% stenosis. 
The right coronary artery had a proximal 90% stenosis.  Ejection fraction was at
the lower limits normal.  Dr. Shah then placed a single drug-eluting stent in
the proximal right coronary artery.  He tolerated this well and was discharged
with a prescription for Brilinta and switched from lovastatin to Crestor. 
According to the patient's wife, he initially did well after his discharge. 
Apparently, they, however, went to a pharmacy when they found out how costly the
Brilinta was they never filled the prescription.  However, the past 2 days, the
patient has been having intermittent chest pain.  He felt short of breath,
diaphoretic.  The pain occurred off and on.  He finally came to the hospital and
was admitted.  He denied any syncope.
 
PAST MEDICAL AND SURGICAL HISTORY:  Significant for back surgery, appendectomy,
 
 
 
[place] [address]                    DISCHARGE SUMMARY             
_______________________________________________________________________________
 
Name:       ANTOINETTEROSE MARY VARGAS               Room:           [address]#:  [id number]      Account #:      [id number]  
Admission:  01/10/18     Attend Phys:    Alessandro Almanza MD 
Discharge:  18     Date of Birth:  [birth year]  
         Report #: [id number]
                                                                     [id number]  
_______________________________________________________________________________
hernia surgery, cholecystectomy.  He has Parkinson's disease, diabetes,
hyperlipidemia.
 
MEDICATIONS:  On admission include omeprazole, insulin, Crestor, fenofibrate,
Flonase, an aspirin a day; however, he is not taking Brilinta.
 
ALLERGIES:  He had intolerance to MORPHINE.
 
PHYSICAL EXAMINATION:
VITAL SIGNS:  His blood pressure 130/80, pulse is 80.
CHEST:  Clear to auscultation.
CARDIAC:  Regular rate and rhythm.
ABDOMEN:  Soft, nontender.
EXTREMITIES:  Had no edema.
 
DIAGNOSTIC DATA:  ECG showed sinus rhythm, left bundle branch block that has
been noted to be prior to admission.  Chest x-ray, normal heart size, clear lung
fields.  Sodium 135, creatinine 0.8.  Troponin which was 0.34 last week was now
0.14.  Cholesterol 246, triglyceride 130, HDL 34, , hemoglobin 16.1.
 
HOSPITAL COURSE:  The patient was felt to be having unstable angina.  The
patient initially had been scheduled to return next week at stenting of the
circumflex.  However, because of recurrent angina, I recommended repeat cardiac
catheterization.  The patient was started on therapeutic Lovenox and nitro
paste.  The following hospital day taken back to the cath lab and performed
angiography from the right radial artery.  An arteriogram of the right coronary
artery showed the stent in the proximal right carotid to be completely occluded.
 It was felt to be secondary to the fact he was not taking the Brilinta.  I
performed angioplasty with reperfusion of the right coronary and placed 2
drug-eluting stents in the right coronary artery.  There was a good result.  An
arteriogram was not performed on the left coronary artery since no intervention
had been performed a week previously.  His LVEDP was normal at 16.  The patient
was transferred back to monitored bed.  Later that afternoon, however, he did
complain of some chest discomfort.  He developed progressive bradycardia and
hypotension.  I was called to evaluate the patient.  On my arrival, the patient
complained of being short of breath and some mild chest discomfort.  ECG showed
a junctional rhythm and he was hypotensive.  His respirations appeared agonal
and I had the Emergency Room physician come to the bedside and intubate the
patient electively.  He was then taken to the cath lab after starting external
pacing and the patient received atropine 2 mg with no result.  His blood
pressure is low and he was started on dopamine infusion.  On arrival to the cath
lab, the patient is on a ventilator and a blood gas, so he was acidotic.  He was
given epinephrine intravenously and sodium bicarbonate.  An arterial sheath was
placed in the right femoral artery and his blood pressure is minimal at 50.  He
was started on Lewis-Synephrine.  I then placed a sheath in the right femoral vein
and placed a temporary pacing lead in the right ventricular apex, began to pace
 
 
 
[address]  [address]                    DISCHARGE SUMMARY             
_______________________________________________________________________________
 
Name:       ROSE MARY CURRY               Room:           [address] [place]  
.R.#:  [id number]      Account #:      [id number]  
Admission:  01/10/18     Attend Phys:    Alessandro Almanza MD 
Discharge:  18     Date of Birth:  [birth year]  
         Report #: [id number]
                                                                     [id number]  
_______________________________________________________________________________
the patient at 100 beats per minute.  However, despite ventricular pacing, blood
pressure is minimal.  The patient was given maximal doses of dopamine and
Lewis-Synephrine, but despite pacing, he had no blood pressure.  At this point, it
was felt that the patient had electrical mechanical dissociation.  He is felt to
have suffered a myocardial infarction and was in cardiogenic shock. 
Unfortunately, the patient had no blood pressure despite pressors and adequate
ventilation while on the ventilator.  The patient was pronounced dead on the
cath lab table.  The results were discussed with the patient's wife.  He had
felt to have occluded the stent that was put in a week ago after not taking
Brilinta.  Despite reperfusion right coronary, the patient developed progressive
cardiogenic shock and .
 
 
 
 
 
 
 
 
 
 
 
 
 
 
 
 
 
 
 
 
 
 
 
 
 
 
 
 
 
 
 
 
 
<ELECTRONICALLY SIGNED>
                                        By:  Talib Daugherty MD, [facility]      
18     1612
D: 18 1929_______________________________________
T: 18Talib Daugherty MD, FACC         /nt

## 2018-01-21 NOTE — D
[address]  [address]                    DISCHARGE SUMMARY             
_______________________________________________________________________________
 
Name:       ROSE MARY CURRY               Room:           [address] [place]  
M.R.#:  [id number]      Account #:      [id number]  
Admission:  01/10/18     Attend Phys:    Alessandro Almanza MD 
Discharge:  01/11/18     Date of Birth:  [birth year]  
         Report #: [id number]
                                                                     [id number]  
_______________________________________________________________________________
THIS REPORT FOR:  //name//                      
 
CC: Alessandro Almanza
    [facility]
 
DATE OF SERVICE:  01/11/2018
 
 
ADDENDUM
 
DISCHARGE DIAGNOSIS:  Remains the same as previously dictated discharge summary
from cardiologist.  Please see the description of events leading up to death on
the summary dictated on 01/11/2018, by Cardiology, diagnosis acute myocardial
infarction and coronary artery disease.
 
 
 
 
 
 
 
 
 
 
 
 
 
 
 
 
 
 
 
 
 
 
 
 
 
 
 
 
 
 
<ELECTRONICALLY SIGNED>
                                        By:  Alessandro Almanza MD          
01/21/18     1909
D: 01/21/18 1738_______________________________________
T: 01/21/18 1811Anicandy Almanza MD             /[facility]